# Patient Record
Sex: FEMALE
[De-identification: names, ages, dates, MRNs, and addresses within clinical notes are randomized per-mention and may not be internally consistent; named-entity substitution may affect disease eponyms.]

---

## 2022-08-03 PROBLEM — Z00.00 ENCOUNTER FOR PREVENTIVE HEALTH EXAMINATION: Status: ACTIVE | Noted: 2022-08-03

## 2022-08-26 ENCOUNTER — APPOINTMENT (OUTPATIENT)
Dept: CT IMAGING | Facility: CLINIC | Age: 52
End: 2022-08-26

## 2022-08-26 ENCOUNTER — APPOINTMENT (OUTPATIENT)
Dept: COLORECTAL SURGERY | Facility: CLINIC | Age: 52
End: 2022-08-26
Payer: COMMERCIAL

## 2022-08-26 ENCOUNTER — RESULT REVIEW (OUTPATIENT)
Age: 52
End: 2022-08-26

## 2022-08-26 VITALS
DIASTOLIC BLOOD PRESSURE: 88 MMHG | BODY MASS INDEX: 28.56 KG/M2 | SYSTOLIC BLOOD PRESSURE: 132 MMHG | TEMPERATURE: 98 F | RESPIRATION RATE: 12 BRPM | HEART RATE: 72 BPM | WEIGHT: 182 LBS | HEIGHT: 67 IN

## 2022-08-26 DIAGNOSIS — K57.32 DIVERTICULITIS OF LARGE INTESTINE W/OUT PERFORATION OR ABSCESS W/OUT BLEEDING: ICD-10-CM

## 2022-08-26 PROCEDURE — 74177 CT ABD & PELVIS W/CONTRAST: CPT

## 2022-08-26 PROCEDURE — 99203 OFFICE O/P NEW LOW 30 MIN: CPT

## 2022-09-07 NOTE — ASSESSMENT
[FreeTextEntry1] : 51-year-old female who presents for consultation regarding diverticulitis.\par \par Patient developed left lower quadrant abdominal pain and had CAT scan proven diverticulitis in 2022.  This was treated with antibiotics.  In 2022 she had her initial colonoscopy which revealed pan-diverticulosis.  In  she developed recurrent left lower quadrant abdominal pain and was empirically treated with Cipro and Flagyl.  In July she developed recurrence of this discomfort and fullness and was placed on a liquid diet with no antibiotics.  No repeat CAT scan was performed.  She continues to have left lower quadrant fullness with radiation to the left flank.\par \par Her past medical history is negative.  Past surgical history includes a  section.\par \par I am not sure if she is developing refractory diverticulitis or if she has irritable bowel disease.  Since she is still having these vague symptoms we will repeat a CAT scan of the abdomen and pelvis. Further recommendations will be based upon the results of the study.

## 2022-09-07 NOTE — HISTORY OF PRESENT ILLNESS
[FreeTextEntry1] : 50yo WF with initial bout of CT proven diverticulitis in Feb 2022. Treated with Cipro/Flagyl. April 2022 initial colonoscopy revealed pan diverticulosis. Advised to maintain high fiber diet.\par \par In both early June 2022 and end of July 2022 pt developed LLQ pain radiating to back. Denies fever, change in bowel habits. Episode in June treated with ABX [id number]. In July pt maintained liquid diet xdays.\par \par Continues to experience left sided "fullness". GI wants to put pt on additional ABX for "gut" for one month. Presents for consultation.

## 2022-09-07 NOTE — PHYSICAL EXAM
[Normal Breath Sounds] : Normal breath sounds [Normal Heart Sounds] : normal heart sounds [Normal Rate and Rhythm] : normal rate and rhythm [No Edema] : No edema [Alert] : alert [Oriented to Person] : oriented to person [Oriented to Place] : oriented to place [Oriented to Time] : oriented to time [Anxious] : anxious [de-identified] : round soft +BS NT/ND [de-identified] : NC/AT [de-identified] : +ROM [de-identified] : intact

## 2022-09-07 NOTE — REVIEW OF SYSTEMS
[Negative] : Endocrine [Chest Pain] : no chest pain [Shortness Of Breath] : no shortness of breath [Easy Bleeding] : no tendency for easy bleeding [Easy Bruising] : no tendency for easy bruising [de-identified] : LLE DVT postpartum (@2002)